# Patient Record
Sex: MALE | Race: WHITE | Employment: OTHER | URBAN - METROPOLITAN AREA
[De-identification: names, ages, dates, MRNs, and addresses within clinical notes are randomized per-mention and may not be internally consistent; named-entity substitution may affect disease eponyms.]

---

## 2023-06-21 ENCOUNTER — VIRTUAL VISIT (OUTPATIENT)
Dept: UROLOGY | Facility: CLINIC | Age: 68
End: 2023-06-21
Payer: COMMERCIAL

## 2023-06-21 DIAGNOSIS — N13.8 BPH WITH OBSTRUCTION/LOWER URINARY TRACT SYMPTOMS: Primary | ICD-10-CM

## 2023-06-21 DIAGNOSIS — N40.1 BPH WITH OBSTRUCTION/LOWER URINARY TRACT SYMPTOMS: Primary | ICD-10-CM

## 2023-06-21 PROCEDURE — 99204 OFFICE O/P NEW MOD 45 MIN: CPT | Mod: VID | Performed by: PHYSICIAN ASSISTANT

## 2023-06-21 RX ORDER — OLMESARTAN MEDOXOMIL 40 MG/1
40 TABLET ORAL DAILY
COMMUNITY

## 2023-06-21 RX ORDER — ATORVASTATIN CALCIUM 40 MG/1
40 TABLET, FILM COATED ORAL DAILY
COMMUNITY

## 2023-06-21 RX ORDER — LEVOTHYROXINE SODIUM 175 UG/1
175 TABLET ORAL DAILY
COMMUNITY

## 2023-06-21 RX ORDER — TAMSULOSIN HYDROCHLORIDE 0.4 MG/1
0.4 CAPSULE ORAL DAILY
COMMUNITY

## 2023-06-21 NOTE — PROGRESS NOTES
Deangelo is a 67 year old who is being evaluated via a billable video visit.      How would you like to obtain your AVS? MyChart  If the video visit is dropped, the invitation should be resent by: Text to cell phone: [phone]  Will anyone else be joining your video visit? No      Tracy Murphy CMA    Video-Visit Details    Type of service:  Video Visit   Start time: 11:04am  End time: 11:22am    Originating Location (pt. Location): Home    Distant Location (provider location):  On-site  Platform used for Video Visit: Ultius     CC: LUTS    HPI:  Deangelo Espinoza is a very pleasant 67 year old male who presents in consultation via self referral for evaluation of the above. He has been told her has an enlarged prostate. Has been on Flomax for years. In April, noted bladder pressure, sense of incomplete emptying, hesitancy, dribbling stream. No hematuria, no dysuria. Bowels regular. Called PCP and given abx course for 7 days and increased Flomax to 0.8mg (contnued to have trouble at end of course). Next ws given Rx for Cipro and finasteride without improvement or actually worsening of symptoms (nocturia x 3-4, dizziness). Stopped finasteride after 10 days.      Back down to 0.4mg Flomax and notes nocturia x 1    Lives in [place] in the winter. Lost  in May.     PSA 1 year ago (in 1 range).     History reviewed. No pertinent past medical history.    Past Surgical History:   Procedure Laterality Date     HERNIA REPAIR         Social History     Socioeconomic History     Marital status:      Spouse name: Not on file     Number of children: Not on file     Years of education: Not on file     Highest education level: Not on file   Occupational History     Not on file   Tobacco Use     Smoking status: Never     Smokeless tobacco: Never   Substance and Sexual Activity     Alcohol use: Yes     Drug use: Never     Sexual activity: Not on file   Other Topics Concern     Not on file   Social History Narrative      Not on file     Social Determinants of Health     Financial Resource Strain: Not on file   Food Insecurity: Not on file   Transportation Needs: Not on file   Physical Activity: Not on file   Stress: Not on file   Social Connections: Not on file   Intimate Partner Violence: Not on file   Housing Stability: Not on file       No family history on file.    No Known Allergies    Current Outpatient Medications   Medication     atorvastatin (LIPITOR) 40 MG tablet     levothyroxine (SYNTHROID/LEVOTHROID) 175 MCG tablet     olmesartan (BENICAR) 40 MG tablet     tamsulosin (FLOMAX) 0.4 MG capsule     No current facility-administered medications for this visit.         PEx:   There were no vitals taken for this visit.    PSYCH: NAD  EYES: EOMI  MOUTH: MMM  NEURO: AAO x3    Urine:      A/P: Deangelo Espinoza is a 67 year old male with BPH w/ LUTS  -We discussed a variety of reasons for worsening LUTS  -PSA annually until 73 years.   -If future episodes, make note if constipation or high irritant consumption, increase flomax to 2 caps and notify me. Consider UA/UC and PVR at that time and if signs of prostatitis, would obtain semen culture.   -follow-up SMITA Tamayo [facility]        28 minutes spent on the date of the encounter doing chart review, review of outside records, review of test results, interpretation of tests, patient visit and documentation

## 2023-06-21 NOTE — LETTER
6/21/2023       RE: Deangelo Espinoza  [address] [address]     Dear Colleague,    Thank you for referring your patient, Deangelo Espinoza, to the [facility] at [facility]. Please see a copy of my visit note below.    Deangelo is a 67 year old who is being evaluated via a billable video visit.      How would you like to obtain your AVS? MyChart  If the video visit is dropped, the invitation should be resent by: Text to cell phone: [phone]  Will anyone else be joining your video visit? No      Tracy Murphy CMA    Video-Visit Details    Type of service:  Video Visit   Start time: 11:04am  End time: 11:22am    Originating Location (pt. Location): Home    Distant Location (provider location):  On-site  Platform used for Video Visit: Narus     CC: LUTS    HPI:  Deangelo Espinoza is a very pleasant 67 year old male who presents in consultation via self referral for evaluation of the above. He has been told her has an enlarged prostate. Has been on Flomax for years. In April, noted bladder pressure, sense of incomplete emptying, hesitancy, dribbling stream. No hematuria, no dysuria. Bowels regular. Called PCP and given abx course for 7 days and increased Flomax to 0.8mg (contnued to have trouble at end of course). Next ws given Rx for Cipro and finasteride without improvement or actually worsening of symptoms (nocturia x 3-4, dizziness). Stopped finasteride after 10 days.      Back down to 0.4mg Flomax and notes nocturia x 1    Lives in [place] in the winter. Lost  in May.     PSA 1 year ago (in 1 range).     History reviewed. No pertinent past medical history.    Past Surgical History:   Procedure Laterality Date    HERNIA REPAIR         Social History     Socioeconomic History    Marital status:      Spouse name: Not on file    Number of children: Not on file    Years of education: Not on file    Highest education  level: Not on file   Occupational History    Not on file   Tobacco Use    Smoking status: Never    Smokeless tobacco: Never   Substance and Sexual Activity    Alcohol use: Yes    Drug use: Never    Sexual activity: Not on file   Other Topics Concern    Not on file   Social History Narrative    Not on file     Social Determinants of Health     Financial Resource Strain: Not on file   Food Insecurity: Not on file   Transportation Needs: Not on file   Physical Activity: Not on file   Stress: Not on file   Social Connections: Not on file   Intimate Partner Violence: Not on file   Housing Stability: Not on file       No family history on file.    No Known Allergies    Current Outpatient Medications   Medication    atorvastatin (LIPITOR) 40 MG tablet    levothyroxine (SYNTHROID/LEVOTHROID) 175 MCG tablet    olmesartan (BENICAR) 40 MG tablet    tamsulosin (FLOMAX) 0.4 MG capsule     No current facility-administered medications for this visit.         PEx:   There were no vitals taken for this visit.    PSYCH: NAD  EYES: EOMI  MOUTH: MMM  NEURO: AAO x3    Urine:      A/P: Deangelo Espinoza is a 67 year old male with BPH w/ LUTS  -We discussed a variety of reasons for worsening LUTS  -PSA annually until 73 years.   -If future episodes, make note if constipation or high irritant consumption, increase flomax to 2 caps and notify me. Consider UA/UC and PVR at that time and if signs of prostatitis, would obtain semen culture.   -follow-up PRITESH Sparks PA-C  [facility]        28 minutes spent on the date of the encounter doing chart review, review of outside records, review of test results, interpretation of tests, patient visit and documentation

## 2023-06-21 NOTE — PATIENT INSTRUCTIONS
Below is a list of things that can irritate the bladder and should be eliminated:    Caffeinated soft drinks.  Coffee.  Tea.  Chocolate.  Tomato-based foods.  Acidic juices and fruits. (includes cranberry juice)  Alcohol.  Nicotine  Carbonated drinks.  Aspartame/Nutrasweet.     If future episodes, make note if constipation or high irritant consumption, increase flomax to 2 caps and notify me. Consider urine culture and bladder scan at that time and if signs of prostatitis, would obtain semen culture.

## 2024-01-14 ENCOUNTER — HEALTH MAINTENANCE LETTER (OUTPATIENT)
Age: 69
End: 2024-01-14

## 2025-01-26 ENCOUNTER — HEALTH MAINTENANCE LETTER (OUTPATIENT)
Age: 70
End: 2025-01-26

## 2025-05-21 ENCOUNTER — LAB (OUTPATIENT)
Dept: LAB | Facility: CLINIC | Age: 70
End: 2025-05-21
Payer: COMMERCIAL

## 2025-05-21 ENCOUNTER — RESULTS FOLLOW-UP (OUTPATIENT)
Dept: URGENT CARE | Facility: CLINIC | Age: 70
End: 2025-05-21

## 2025-05-21 ENCOUNTER — VIRTUAL VISIT (OUTPATIENT)
Dept: URGENT CARE | Facility: CLINIC | Age: 70
End: 2025-05-21
Payer: COMMERCIAL

## 2025-05-21 DIAGNOSIS — N39.9 URINARY PROBLEM IN MALE: Primary | ICD-10-CM

## 2025-05-21 DIAGNOSIS — N41.0 PROSTATITIS, ACUTE: ICD-10-CM

## 2025-05-21 DIAGNOSIS — N39.9 URINARY PROBLEM IN MALE: ICD-10-CM

## 2025-05-21 LAB
ALBUMIN UR-MCNC: NEGATIVE MG/DL
APPEARANCE UR: CLEAR
BILIRUB UR QL STRIP: NEGATIVE
COLOR UR AUTO: YELLOW
GLUCOSE UR STRIP-MCNC: NEGATIVE MG/DL
HGB UR QL STRIP: NEGATIVE
KETONES UR STRIP-MCNC: NEGATIVE MG/DL
LEUKOCYTE ESTERASE UR QL STRIP: NEGATIVE
NITRATE UR QL: NEGATIVE
PH UR STRIP: 5.5 [PH] (ref 5–7)
SP GR UR STRIP: 1.01 (ref 1–1.03)
UROBILINOGEN UR STRIP-ACNC: 0.2 E.U./DL

## 2025-05-21 PROCEDURE — 81003 URINALYSIS AUTO W/O SCOPE: CPT

## 2025-05-21 PROCEDURE — 98001 SYNCH AUDIO-VIDEO NEW LOW 30: CPT

## 2025-05-21 RX ORDER — CIPROFLOXACIN 500 MG/1
500 TABLET, FILM COATED ORAL 2 TIMES DAILY
Qty: 14 TABLET | Refills: 0 | Status: SHIPPED | OUTPATIENT
Start: 2025-05-21 | End: 2025-05-28

## 2025-05-21 NOTE — PROGRESS NOTES
"  Deangelo Espinoza is a 69 year old male who is being evaluated via a billable video visit.      The patient has been notified of following at the time of scheduling video visit:     \"This video visit will be conducted via a video call between you and your physician/provider. We have found that certain health care needs can be provided without the need for a physical exam.  This service lets us provide the care you need with a video conversation.  If a prescription is necessary we can send it directly to your pharmacy.  If lab work is needed we can place an order for that and you can then stop by our lab to have the test done at a later time.\"   Patient has given consent for video visit?  YES    SUBJECTIVE:  Deangelo Espinoza is an 69 year old male who presents for urinary issue.  Over the past few days he has trouble urinating in the evening.  Is getting up to void 4-5 times a night which is mor often than usual for him.  Has a sense of pain and pressure in lower abdomen.  No pain or burning with passing urine.  Urine looks a little different than usual, like more paile, and has a weak stream.  Has had these sxs a couple times before an was dx with prostatitis and treated with cipro.  Has seen a urologist several years ago but was asymptomatic when he was seen and they told him to f/up when had recurrence.  No fevers, chills, sweats.  No n/v/d.  Feels fine otherwise.    PMH:   has no past medical history on file.  There is no problem list on file for this patient.    Social History     Socioeconomic History    Marital status:    Tobacco Use    Smoking status: Never    Smokeless tobacco: Never   Substance and Sexual Activity    Alcohol use: Yes    Drug use: Never     Social Drivers of Health     Food Insecurity: No Food Insecurity (3/1/2025)    Received from [facility]    Hunger Vital Sign     Worried About Running Out of Food in the Last Year: Never true     Ran Out of Food in the Last Year: Never true "   Transportation Needs: No Transportation Needs (3/1/2025)    Received from [facility]    PRAPARE - Transportation     Lack of Transportation (Medical): No     Lack of Transportation (Non-Medical): No   Physical Activity: Sufficiently Active (3/1/2025)    Received from [facility]    Exercise Vital Sign     Days of Exercise per Week: 7 days     Minutes of Exercise per Session: 60 min   Housing Stability: Low Risk  (3/1/2025)    Received from [facility]    Housing Stability     What is your living situation today?: I have a steady place to live     No family history on file.    ALLERGIES:  Patient has no known allergies.    Current Outpatient Medications   Medication Sig Dispense Refill    atorvastatin (LIPITOR) 40 MG tablet Take 40 mg by mouth daily      levothyroxine (SYNTHROID/LEVOTHROID) 175 MCG tablet Take 175 mcg by mouth daily      olmesartan (BENICAR) 40 MG tablet Take 40 mg by mouth daily      tamsulosin (FLOMAX) 0.4 MG capsule Take 0.4 mg by mouth daily       No current facility-administered medications for this visit.         ROS:  ROS is done and is negative for general/constitutional, eye, ENT, Respiratory, cardiovascular, GI, , Skin, musculoskeletal except as noted elsewhere.  All other review of systems negative except as noted elsewhere.      OBJECTIVE:    No vital signs obtained as is virtual visit    GENERAL: alert and no distress  EYES: Eyes grossly normal to inspection.  No discharge or erythema, or obvious scleral/conjunctival abnormalities.  RESP: No audible wheeze, cough, or visible cyanosis.    SKIN: Visible skin clear. No significant rash, abnormal pigmentation or lesions.  NEURO: Cranial nerves grossly intact.  Mentation and speech appropriate for age.  PSYCH: Appropriate affect, tone, and pace of words         ASSESSMENT/PLAN:    ASSESSMENT / PLAN:  (N39.9) Urinary problem in male  (primary encounter diagnosis)  Comment: based on his hx, suspect recurrence of prostatitis.  Will have pt  leave a urine sample before starting on abx.  After leaves sample at lab, can start cipro as this has worked well for him before.  Continue flomax.  Suspect he may have some underlying bph.  Advised him to see a urologist.  Plan: UA Macroscopic with reflex to Microscopic and         Culture - Lab Collect, Urine Culture Aerobic         Bacterial - lab collect, ciprofloxacin (CIPRO)         500 MG tablet, Adult Urology  Referral        Reviewed medication instructions and side effects including tendon problems and tendon rupture. Follow up if experiences side effects.. I reviewed supportive care, otc meds to use if needed, expected course, and signs of concern.  Follow up as needed or if does not improve within 5 day(s) or if worsens in any way.  Reviewed red flag symptoms and is to go to the ER if experiences any of these.    (N41.0) Prostatitis, acute  Comment: as above  Plan: UA Macroscopic with reflex to Microscopic and         Culture - Lab Collect, Urine Culture Aerobic         Bacterial - lab collect, ciprofloxacin (CIPRO)         500 MG tablet, Adult Urology  Referral              See [facility] for orders, medications, letters, patient instructions    Shakila Belle MD  5/21/2025, 2:40 PM    Video-Visit Details    Video Start Time: 2:45    Type of service:  Video Visit    Video End Time:2:59 PM    Originating Location (pt. Location): Home    Distant Location (provider location):  [facility]     [facility]

## 2025-05-23 LAB — BACTERIA UR CULT: NO GROWTH

## 2025-05-26 ENCOUNTER — APPOINTMENT (OUTPATIENT)
Dept: GENERAL RADIOLOGY | Facility: CLINIC | Age: 70
End: 2025-05-26
Attending: EMERGENCY MEDICINE
Payer: COMMERCIAL

## 2025-05-26 ENCOUNTER — HOSPITAL ENCOUNTER (EMERGENCY)
Facility: CLINIC | Age: 70
Discharge: HOME | End: 2025-05-26
Attending: EMERGENCY MEDICINE | Admitting: EMERGENCY MEDICINE
Payer: COMMERCIAL

## 2025-05-26 ENCOUNTER — NURSE TRIAGE (OUTPATIENT)
Dept: NURSING | Facility: CLINIC | Age: 70
End: 2025-05-26
Payer: COMMERCIAL

## 2025-05-26 ENCOUNTER — APPOINTMENT (OUTPATIENT)
Dept: ULTRASOUND IMAGING | Facility: CLINIC | Age: 70
End: 2025-05-26
Attending: EMERGENCY MEDICINE
Payer: COMMERCIAL

## 2025-05-26 VITALS
TEMPERATURE: 98 F | OXYGEN SATURATION: 96 % | HEART RATE: 72 BPM | DIASTOLIC BLOOD PRESSURE: 77 MMHG | SYSTOLIC BLOOD PRESSURE: 116 MMHG | RESPIRATION RATE: 16 BRPM

## 2025-05-26 DIAGNOSIS — R39.15 URINARY URGENCY: ICD-10-CM

## 2025-05-26 DIAGNOSIS — R06.02 SHORTNESS OF BREATH: ICD-10-CM

## 2025-05-26 PROBLEM — E03.9 HYPOTHYROIDISM: Status: ACTIVE | Noted: 2021-03-09

## 2025-05-26 PROBLEM — I10 HTN (HYPERTENSION): Status: ACTIVE | Noted: 2021-03-09

## 2025-05-26 PROBLEM — C43.9 METASTATIC MELANOMA (H): Status: ACTIVE | Noted: 2025-05-26

## 2025-05-26 PROBLEM — C43.59 MALIGNANT MELANOMA OF TORSO EXCLUDING BREAST (H): Status: ACTIVE | Noted: 2024-02-29

## 2025-05-26 PROBLEM — J45.909 ASTHMA: Status: ACTIVE | Noted: 2025-05-26

## 2025-05-26 PROBLEM — N40.0 BPH (BENIGN PROSTATIC HYPERPLASIA): Status: ACTIVE | Noted: 2025-05-26

## 2025-05-26 PROBLEM — E78.00 HIGH CHOLESTEROL: Status: ACTIVE | Noted: 2025-05-26

## 2025-05-26 PROBLEM — E04.1 THYROID NODULE: Status: ACTIVE | Noted: 2025-05-26

## 2025-05-26 LAB
ANION GAP SERPL CALCULATED.3IONS-SCNC: 10 MMOL/L (ref 7–15)
ATRIAL RATE - MUSE: 86 BPM
BASOPHILS # BLD AUTO: 0 10E3/UL (ref 0–0.2)
BASOPHILS NFR BLD AUTO: 0 %
BUN SERPL-MCNC: 9.8 MG/DL (ref 8–23)
CALCIUM SERPL-MCNC: 9.2 MG/DL (ref 8.8–10.4)
CHLORIDE SERPL-SCNC: 94 MMOL/L (ref 98–107)
CREAT SERPL-MCNC: 1.16 MG/DL (ref 0.67–1.17)
D DIMER PPP FEU-MCNC: 0.72 UG/ML FEU (ref 0–0.5)
DIASTOLIC BLOOD PRESSURE - MUSE: NORMAL MMHG
EGFRCR SERPLBLD CKD-EPI 2021: 67 ML/MIN/1.73M2
EOSINOPHIL # BLD AUTO: 0.3 10E3/UL (ref 0–0.7)
EOSINOPHIL NFR BLD AUTO: 6 %
ERYTHROCYTE [DISTWIDTH] IN BLOOD BY AUTOMATED COUNT: 11.6 % (ref 10–15)
GLUCOSE SERPL-MCNC: 101 MG/DL (ref 70–99)
HCO3 SERPL-SCNC: 27 MMOL/L (ref 22–29)
HCT VFR BLD AUTO: 38.8 % (ref 40–53)
HGB BLD-MCNC: 14.2 G/DL (ref 13.3–17.7)
HOLD SPECIMEN: NORMAL
IMM GRANULOCYTES # BLD: 0 10E3/UL
IMM GRANULOCYTES NFR BLD: 0 %
INTERPRETATION ECG - MUSE: NORMAL
LYMPHOCYTES # BLD AUTO: 2.1 10E3/UL (ref 0.8–5.3)
LYMPHOCYTES NFR BLD AUTO: 43 %
MCH RBC QN AUTO: 31.8 PG (ref 26.5–33)
MCHC RBC AUTO-ENTMCNC: 36.6 G/DL (ref 31.5–36.5)
MCV RBC AUTO: 87 FL (ref 78–100)
MONOCYTES # BLD AUTO: 0.6 10E3/UL (ref 0–1.3)
MONOCYTES NFR BLD AUTO: 12 %
NEUTROPHILS # BLD AUTO: 1.9 10E3/UL (ref 1.6–8.3)
NEUTROPHILS NFR BLD AUTO: 39 %
NRBC # BLD AUTO: 0 10E3/UL
NRBC BLD AUTO-RTO: 0 /100
P AXIS - MUSE: 29 DEGREES
PLATELET # BLD AUTO: 146 10E3/UL (ref 150–450)
POTASSIUM SERPL-SCNC: 3.3 MMOL/L (ref 3.4–5.3)
PR INTERVAL - MUSE: 174 MS
QRS DURATION - MUSE: 98 MS
QT - MUSE: 382 MS
QTC - MUSE: 457 MS
R AXIS - MUSE: 31 DEGREES
RBC # BLD AUTO: 4.47 10E6/UL (ref 4.4–5.9)
SODIUM SERPL-SCNC: 131 MMOL/L (ref 135–145)
SYSTOLIC BLOOD PRESSURE - MUSE: NORMAL MMHG
T AXIS - MUSE: 28 DEGREES
TROPONIN T SERPL HS-MCNC: 14 NG/L
VENTRICULAR RATE- MUSE: 86 BPM
WBC # BLD AUTO: 5 10E3/UL (ref 4–11)

## 2025-05-26 PROCEDURE — 85379 FIBRIN DEGRADATION QUANT: CPT | Performed by: EMERGENCY MEDICINE

## 2025-05-26 PROCEDURE — 80048 BASIC METABOLIC PNL TOTAL CA: CPT | Performed by: EMERGENCY MEDICINE

## 2025-05-26 PROCEDURE — 85025 COMPLETE CBC W/AUTO DIFF WBC: CPT | Performed by: EMERGENCY MEDICINE

## 2025-05-26 PROCEDURE — 71046 X-RAY EXAM CHEST 2 VIEWS: CPT

## 2025-05-26 PROCEDURE — 99285 EMERGENCY DEPT VISIT HI MDM: CPT | Mod: 25

## 2025-05-26 PROCEDURE — 36415 COLL VENOUS BLD VENIPUNCTURE: CPT | Performed by: EMERGENCY MEDICINE

## 2025-05-26 PROCEDURE — 250N000013 HC RX MED GY IP 250 OP 250 PS 637: Performed by: EMERGENCY MEDICINE

## 2025-05-26 PROCEDURE — 84484 ASSAY OF TROPONIN QUANT: CPT | Performed by: EMERGENCY MEDICINE

## 2025-05-26 PROCEDURE — 96360 HYDRATION IV INFUSION INIT: CPT

## 2025-05-26 PROCEDURE — 258N000003 HC RX IP 258 OP 636: Performed by: EMERGENCY MEDICINE

## 2025-05-26 PROCEDURE — 93970 EXTREMITY STUDY: CPT

## 2025-05-26 PROCEDURE — 96361 HYDRATE IV INFUSION ADD-ON: CPT

## 2025-05-26 PROCEDURE — 93005 ELECTROCARDIOGRAM TRACING: CPT

## 2025-05-26 RX ORDER — POTASSIUM CHLORIDE 1.5 G/1.58G
20 POWDER, FOR SOLUTION ORAL ONCE
Status: COMPLETED | OUTPATIENT
Start: 2025-05-26 | End: 2025-05-26

## 2025-05-26 RX ADMIN — SODIUM CHLORIDE 1000 ML: 0.9 INJECTION, SOLUTION INTRAVENOUS at 14:44

## 2025-05-26 RX ADMIN — POTASSIUM CHLORIDE 20 MEQ: 1.5 POWDER, FOR SOLUTION ORAL at 14:45

## 2025-05-26 ASSESSMENT — COLUMBIA-SUICIDE SEVERITY RATING SCALE - C-SSRS
6. HAVE YOU EVER DONE ANYTHING, STARTED TO DO ANYTHING, OR PREPARED TO DO ANYTHING TO END YOUR LIFE?: NO
2. HAVE YOU ACTUALLY HAD ANY THOUGHTS OF KILLING YOURSELF IN THE PAST MONTH?: NO
1. IN THE PAST MONTH, HAVE YOU WISHED YOU WERE DEAD OR WISHED YOU COULD GO TO SLEEP AND NOT WAKE UP?: NO

## 2025-05-26 ASSESSMENT — ACTIVITIES OF DAILY LIVING (ADL)
ADLS_ACUITY_SCORE: 41

## 2025-05-26 NOTE — TELEPHONE ENCOUNTER
Nurse Triage SBAR    Is this a 2nd Level Triage? NO    Situation: lightheadedness and urinary retention. Some shortness of breath when standing when he becomes lightheaded.    Background: Wondering if the two are related. Also has PET scan tomorrow at [place] that he doesn't want to miss. Shortness of breath lightheaded and dizzy off and on last several weeks. Has gotten a lot worse recently. Problems emptying his bladder. Also having low appetite.  Belching a lot. A few weeks ago was having leg pain upon awakening. Needs to strain to have bowel movements.    Assessment: Needs to evaluated.    Protocol Recommended Disposition:   Go to ED  Be seen within 24 hours.  Caller stated understanding and agreement of both dispositions.     Reason for Disposition   [1] Unable to urinate (or only a few drops) > 4 hours AND [2] bladder feels very full (e.g., palpable bladder or strong urge to urinate)   [1] MODERATE dizziness (e.g., interferes with normal activities) AND [2] has NOT been evaluated by doctor (or NP/PA) for this  (Exception: Dizziness caused by heat exposure, sudden standing, or poor fluid intake.)    Additional Information   Negative: Shock suspected (e.g., cold/pale/clammy skin, too weak to stand, low BP, rapid pulse)   Negative: Sounds like a life-threatening emergency to the triager   Negative: Followed a female genital area injury (e.g., labia, vagina, vulva)   Negative: Followed a male genital area injury (e.g., penis, scrotum)   Negative: Vaginal discharge   Negative: Pus (white, yellow) or bloody discharge from end of penis   Negative: [1] Taking antibiotic for urinary tract infection (UTI) AND [2] female   Negative: [1] Taking antibiotic for urinary tract infection (UTI) AND [2] male   Negative: [1] Pain or burning with passing urine (urination) AND [2] pregnant   Negative: [1] Pain or burning with passing urine (urination) AND [2] postpartum (from 0 to 6 weeks after delivery)   Negative: [1] Pain or  "burning with passing urine (urination) AND [2] female   Negative: [1] Pain or burning with passing urine (urination) AND [2] male   Negative: Pain or itching in the vulvar area   Negative: Pain in scrotum is main symptom   Negative: Blood in the urine is main symptom   Negative: Symptoms arising from use of a urinary catheter (e.g., Coude, Christensen)   Negative: SEVERE difficulty breathing (e.g., struggling for each breath, speaks in single words)   Negative: [1] Difficulty breathing or swallowing AND [2] started suddenly after medicine, an allergic food or bee sting   Negative: Shock suspected (e.g., cold/pale/clammy skin, too weak to stand, low BP, rapid pulse)   Negative: Difficult to awaken or acting confused (e.g., disoriented, slurred speech)   Negative: [1] Weakness (i.e., paralysis, loss of muscle strength) of the face, arm or leg on one side of the body AND [2] sudden onset AND [3] present now   Negative: [1] Numbness (i.e., loss of sensation) of the face, arm or leg on one side of the body AND [2] sudden onset AND [3] present now   Negative: [1] Loss of speech or garbled speech AND [2] sudden onset AND [3] present now   Negative: Overdose (accidental or intentional) of medications   Negative: [1] Fainted > 15 minutes ago AND [2] still feels too weak or dizzy to stand   Negative: Heart beating < 50 beats per minute OR > 140 beats per minute   Negative: Sounds like a life-threatening emergency to the triager   Negative: Chest pain   Negative: Rectal bleeding, bloody stool, or tarry-black stool   Negative: [1] Vomiting AND [2] contains red blood or black (\"coffee ground\") material   Negative: Vomiting is main symptom   Negative: Diarrhea is main symptom   Negative: Headache is main symptom   Negative: Patient states that they are having an anxiety or panic attack   Negative: Dizziness from low blood sugar (i.e., < 60 mg/dl or 3.5 mmol/l)   Negative: Dizziness is described as a spinning sensation (i.e., vertigo)   " "Negative: Heat exhaustion suspected (i.e., dehydration from heat exposure)   Negative: Difficulty breathing   Negative: SEVERE dizziness (e.g., unable to stand, requires support to walk, feels like passing out now)   Negative: Extra heartbeats, irregular heart beating, or heart is beating very fast  (i.e., \"palpitations\")   Negative: [1] Drinking very little AND [2] dehydration suspected (e.g., no urine > 12 hours, very dry mouth, very lightheaded)   Negative: [1] Numbness (i.e., loss of sensation) of the face, arm / hand, or leg / foot on one side of the body AND [2] sudden onset AND [3] brief (now gone)   Negative: [1] Weakness (i.e., paralysis, loss of muscle strength) of the face, arm / hand, or leg / foot on one side of the body AND [2] sudden onset AND [3] brief (now gone)   Negative: [1] Loss of speech or garbled speech AND [2] sudden onset AND [3] brief (now gone)   Negative: Loss of vision or double vision  (Exception: Similar to previous migraines.)   Negative: Patient sounds very sick or weak to the triager   Negative: [1] Dizziness caused by heat exposure, sudden standing, or poor fluid intake AND [2] no improvement after 2 hours of rest and fluids   Negative: [1] Fever > 103 F (39.4 C) AND [2] not able to get the fever down using Fever Care Advice   Negative: [1] Fever > 101 F (38.3 C) AND [2] age > 60 years   Negative: [1] Fever > 100.0 F (37.8 C) AND [2] bedridden (e.g., CVA, chronic illness, recovering from surgery)   Negative: [1] Fever > 100.0 F (37.8 C) AND [2] diabetes mellitus or weak immune system (e.g., HIV positive, cancer chemo, splenectomy, organ transplant, chronic steroids)    Answer Assessment - Initial Assessment Questions  1. SYMPTOM: \"What's the main symptom you're concerned about?\" (e.g., frequency, incontinence)      Retained urine.  Feels pressure - heavy pain/discomfort.  2. ONSET: \"When did the retention/pain start?\"      A week ago.  3. PAIN: \"Is there any pain?\" If Yes, ask: " "\"How bad is it?\" (Scale: 1-10; mild, moderate, severe)      7/10 at night  4. CAUSE: \"What do you think is causing the symptoms?\"      Unsure  5. OTHER SYMPTOMS: \"Do you have any other symptoms?\" (e.g., blood in urine, fever, flank pain, pain with urination)      None  6. PREGNANCY: \"Is there any chance you are pregnant?\" \"When was your last menstrual period?\"      N/a    Answer Assessment - Initial Assessment Questions  1. DESCRIPTION: \"Describe your dizziness.\"    None  2. LIGHTHEADED: \"Do you feel lightheaded?\" (e.g., somewhat faint, woozy, weak upon standing)      Yes  3. VERTIGO: \"Do you feel like either you or the room is spinning or tilting?\" (i.e. vertigo)      No  4. SEVERITY: \"How bad is it?\"  \"Do you feel like you are going to faint?\" \"Can you stand and walk?\"    - MILD: Feels slightly dizzy, but walking normally.    - MODERATE: Feels unsteady when walking, but not falling; interferes with normal activities (e.g., school, work).    - SEVERE: Unable to walk without falling, or requires assistance to walk without falling; feels like passing out now.       Walks slowly, cautiously.  5. ONSET:  \"When did the dizziness begin?\"      Gotten worse in the past week.  6. AGGRAVATING FACTORS: \"Does anything make it worse?\" (e.g., standing, change in head position)      Standing  7. HEART RATE: \"Can you tell me your heart rate?\" \"How many beats in 15 seconds?\"  (Note: not all patients can do this)        Elevated.  90's.  Usually 75-80  8. CAUSE: \"What do you think is causing the dizziness?\"      Unsure  9. RECURRENT SYMPTOM: \"Have you had dizziness before?\" If Yes, ask: \"When was the last time?\" \"What happened that time?\"      This has gone one for a while.  Worse the last week.  10. OTHER SYMPTOMS: \"Do you have any other symptoms?\" (e.g., fever, chest pain, vomiting, diarrhea, bleeding)        Gets short of breath when standing  11. PREGNANCY: \"Is there any chance you are pregnant?\" \"When was your last menstrual " "period?\"        N/a    Protocols used: Urinary Symptoms-A-AH, Dizziness - Istnxovnmtmaorh-X-UC  Amina AGRAWAL RN [facility]     "

## 2025-05-26 NOTE — ED PROVIDER NOTES
Emergency Department Note      History of Present Illness     Chief Complaint   Shortness of Breath (/) and Urinary Retention      HPI   Deangelo Espinoza is a 69 year old male with history of hypertension and hyperlipidemia who presents to the ED for evaluation of shortness of breath and urinary retention. The patient reports that for the past couple of weeks he has been dealing with pelvic pain and symptoms of frequent urination and feeling like he is incompletely emptying his bladder.  He states that he was prescribed antibiotics which have slightly helped. He reports that he has been having intermittent lightheadedness since the onset of his pelvic pain. The past 2 days, he has been having increasing episodes of lightheadedness as well as shortness of breath. His shortness of breath is worse at night and also with exertion. Patient reports that he had an appointment at [facility] a few days ago and when they walked him up a flight of stairs, his SpO2 dropped to 85% but quickly patrizia to the 90s after short period of time. While at [place], he also had labs done and his creatinine was elevated. Patient notes that lately he has noticed his blood pressure drop lower than normal after taking his anti-hypertensive medications. He denies any chest pain, pleuritic chest pain, or blood thinner use.    The patient reports that he has dealt with urinary urgency frequency in the past and it has always been related to his prostate. However, his urinary symptoms have never lasted as long as this time. He notes that his urine has a yellow color in the day time but is clear at night. Patient has noticed slight leg swelling recently. He states that a few weeks ago he was dealing with leg pain that did not feel muscular. Patient reports that his leg pain has since relieved.    Independent Historian   None    Review of External Notes   I reviewed outpatient virtual visit note from 5/21/2025:  Patient was seen for lower urinary tract  symptoms at that time and was suspected to have a urinary tract infection or prostatitis. He was started on ciprofloxacin 500 mg twice daily.  Urinalysis was ordered and was negative. Urine culture negative.     Past Medical History     Medical History and Problem List   Coronary artery calcinosis  Hypertension  Hypothyroidism  BPH  Ascending aortic aneurysm  Hyperlipidemia  Asthma  Skin cancer    Medications   Atorvastatin  Cipro  Levothyroxine  Olmesartan  Tamsulosin  Aspirin 81 mg  Cialis  Ezetimibe    Surgical History   Hernia repair  Melanoma of skin biopsy, with lymphnode removed    Physical Exam     Patient Vitals for the past 48 hrs:   BP Temp Pulse Resp SpO2   05/26/25 2033 116/77 -- 72 16 96 %   05/26/25 1246 127/81 98  F (36.7  C) 94 16 99 %         Physical Exam  General: Well appearing, nontoxic.  Resting comfortably  Head:  Scalp, face, and head appear normal  Eyes:  Pupils are equal, round    Conjunctivae non-injected and sclerae white  ENT:    The external nose is normal    Pinnae are normal  Neck:  Normal range of motion    There is no rigidity noted    Trachea is in the midline  CV:  Regular rate and rhythm     Normal S1/S2, no S3/S4    No murmur or rub. Radial pulses 2+ bilaterally.  Resp:  Lungs are clear and equal bilaterally  There is no tachypnea    No increased work of breathing    No rales, wheezing, or rhonchi  GI:  Abdomen is soft, no rigidity or guarding    No distension, or mass. No CVA tenderness. Mild suprapubic tenderness. No palpable bladder swelling/distention.    No rebound tenderness   MS:  Normal muscular tone    Symmetric motor strength    No lower extremity edema. No calf swelling or tenderness.  Skin:  No rash or acute skin lesions noted  Neuro:  Awake and alert  Speech is normal and fluent  Moves all extremities spontaneously  Psych: Normal affect. Appropriate interactions.      Diagnostics     Lab Results   Labs Ordered and Resulted from Time of ED Arrival to Time of ED  Departure   BASIC METABOLIC PANEL - Abnormal       Result Value    Sodium 131 (*)     Potassium 3.3 (*)     Chloride 94 (*)     Carbon Dioxide (CO2) 27      Anion Gap 10      Urea Nitrogen 9.8      Creatinine 1.16      GFR Estimate 67      Calcium 9.2      Glucose 101 (*)    CBC WITH PLATELETS AND DIFFERENTIAL - Abnormal    WBC Count 5.0      RBC Count 4.47      Hemoglobin 14.2      Hematocrit 38.8 (*)     MCV 87      MCH 31.8      MCHC 36.6 (*)     RDW 11.6      Platelet Count 146 (*)     % Neutrophils 39      % Lymphocytes 43      % Monocytes 12      % Eosinophils 6      % Basophils 0      % Immature Granulocytes 0      NRBCs per 100 WBC 0      Absolute Neutrophils 1.9      Absolute Lymphocytes 2.1      Absolute Monocytes 0.6      Absolute Eosinophils 0.3      Absolute Basophils 0.0      Absolute Immature Granulocytes 0.0      Absolute NRBCs 0.0     D DIMER QUANTITATIVE - Abnormal    D-Dimer Quantitative 0.72 (*)    TROPONIN T, HIGH SENSITIVITY - Normal    Troponin T, High Sensitivity 14         Imaging   XR Chest 2 Views   Final Result   IMPRESSION: Negative chest.      US Lower Extremity Venous Duplex Bilateral   Final Result   IMPRESSION:   1.  No deep venous thrombosis in the bilateral lower extremities.      NM Lung vent and perf*    (Results Pending)       EKG   ECG taken at 12:53, ECG read at 14:14  Normal sinus rhythm   Rate 86 bpm. ID interval 174 ms. QRS duration 98 ms. QT/QTc 382/457 ms. P-R-T axes 29 31 28.    Independent Interpretation   See ED Course    ED Course      Medications Administered   Medications   sodium chloride 0.9% BOLUS 1,000 mL (0 mLs Intravenous Stopped 5/26/25 2018)   potassium chloride (KLOR-CON) Packet 20 mEq (20 mEq Oral $Given 5/26/25 3110)       Procedures   None     Discussion of Management   None    ED Course   ED Course as of 05/27/25 1640   Mon May 26, 2025   5396 I obtained history and examined the patient as noted above.    2019 On my independent interpretation of the  patient's chest radiograph(s) there is no pneumothorax or large pleural effusions.    2022 I rechecked and updated the patient. Plan is for discharge       Additional Documentation  None    Medical Decision Making / Diagnosis     CMS Diagnoses: None    MIPS   None    MDM   Deangelo Espinoza is a 69 year old male who presents to the emergency department today with several different concerns.  On my evaluation he is well-appearing, hemodynamically stable and afebrile.  No increased work of breathing, respiratory distress or hypoxia.  Lungs are clear to auscultation.  No peripheral edema or evidence of volume overload.  Patient initially reports concerns about urinary urgency frequency and feeling like he is incompletely emptying his bladder.  He recently had a virtual visit and was prescribed ciprofloxacin for this.  He has taken this for several days now and it has not helped.  No hematuria fever.  No abdominal or flank pain.  Urinalysis and urine culture from his virtual visit were negative.  Postvoid residual bladder scan was done in the emergency department and was normal (45ml).  No evidence of urinary retention.  Creatinine and BUN are normal.  Renal function is normal.  Lower urinary tract symptoms are likely due to BPH.  He is already taking Flomax.  He will continue this.  I will have him finish the course of his antibiotics as he has had prostatitis in the past and he will need close follow-up with urology if the symptoms do not resolve.  I encouraged good oral fluid intake.  Clinical presentation is not consistent with kidney stone.  Recent urinalysis had no RBCs.    Additionally patient reported intermittent shortness of breath which is not present on ED evaluation.  CBC without anemia.  No leukocytosis.  High-sensitivity troponin is normal and patient has no chest pain whatsoever.  EKG shows sinus rhythm without evidence of acute ischemia or dysrhythmia.  ED evaluation is not consistent with acute coronary  syndrome, acute aortic emergency.  Pulmonary embolism is considered especially in light of his history of metastatic cancer.  D-dimer was obtained and was 0.72.  Using age-adjusted D-dimer criteria the normal threshold would be 0.71.  The patient's value today is 0.01 above this.  Venous duplex ultrasound of the bilateral lower extremities were negative for DVT.  Patient has a significant IV CT contrast allergy and reports that even with pretreatment in the past he has developed significant hives and discomfort and is refusing IV contrast for CT of the chest with PE protocol.  A nuclear medicine V/Q study is not available today.  Overall the patient is low risk for PE with D-dimer essentially at the age-adjusted expected limit.  He has no secondary signs of PE such as right heart strain, right bundle branch block, elevated troponin, tachycardia, hypotension or hypoxia.  If he has a PE would certainly be a small one and given that his venous duplex ultrasound of the bilateral lower extremities are negative there is no risk for additional clot transit.  This was discussed at length with the patient.  I placed an order for an outpatient VQ scan and patient will discuss with his oncologist whether or not he needs to proceed with this study.  Chest x-ray is negative.  No pneumothorax, pleural effusions, pulmonary edema, pneumonia or any other acute pathology.  No wheezing or bronchospasm on exam.  At this time I find no evidence to suggest an emergent life-threatening or serious underlying etiology for his intermittent shortness of breath.  He should follow-up closely with his oncologist and primary care team.  Close return precautions are provided.  Patient is agreeable with the plan of care and he was discharged in stable condition.    Disposition   The patient was discharged.     Diagnosis     ICD-10-CM    1. Shortness of breath  R06.02 NM Lung vent and perf*      2. Urinary urgency  R39.15            Discharge  Medications   Discharge Medication List as of 5/26/2025  8:31 PM            Scribe Disclosure:  I, Rajinder Harvey, am serving as a scribe at 1:40 PM on 5/26/2025 to document services personally performed by Reinaldo De La Rosa MD based on my observations and the provider's statements to me.        Reinaldo De La Rosa MD  05/27/25 3426

## 2025-05-26 NOTE — ED TRIAGE NOTES
"Pt arrives via triage presenting with multiple complaints. Pt states he has had about 1 month of SOB and lightheadedness, increasing over the last few days. Pt also endorses intermittent urinary retention and says \"I am not emptying my bladder fully.\" Recently went to  and prescribed antibiotics for urinary tract infection. Hx of high creatine so drinking extra water.      Triage Assessment (Adult)       Row Name 05/26/25 1244          Triage Assessment    Airway WDL WDL        Respiratory WDL    Respiratory WDL WDL        Cardiac WDL    Cardiac WDL WDL        Peripheral/Neurovascular WDL    Peripheral Neurovascular WDL WDL        Cognitive/Neuro/Behavioral WDL    Cognitive/Neuro/Behavioral WDL WDL                     "

## 2025-05-27 NOTE — DISCHARGE INSTRUCTIONS
A VQ Lung scan was ordered to further rule out a Pulmonary Embolism (blood clot in the lungs). This can be scheduled as an outpatient. You may complete this as soon as possible or wait to discuss it with your Oncologist. Return to the ER for any  worsening chest pain, shortness of breath, or fainting.

## 2025-06-02 ENCOUNTER — OFFICE VISIT (OUTPATIENT)
Dept: UROLOGY | Facility: CLINIC | Age: 70
End: 2025-06-02
Attending: INTERNAL MEDICINE
Payer: COMMERCIAL

## 2025-06-02 ENCOUNTER — PRE VISIT (OUTPATIENT)
Dept: UROLOGY | Facility: CLINIC | Age: 70
End: 2025-06-02

## 2025-06-02 VITALS
HEIGHT: 70 IN | SYSTOLIC BLOOD PRESSURE: 104 MMHG | DIASTOLIC BLOOD PRESSURE: 70 MMHG | HEART RATE: 91 BPM | BODY MASS INDEX: 28.63 KG/M2 | WEIGHT: 200 LBS | OXYGEN SATURATION: 97 %

## 2025-06-02 DIAGNOSIS — R35.0 URINARY FREQUENCY: ICD-10-CM

## 2025-06-02 DIAGNOSIS — R39.14 FEELING OF INCOMPLETE BLADDER EMPTYING: Primary | ICD-10-CM

## 2025-06-02 DIAGNOSIS — N41.0 PROSTATITIS, ACUTE: ICD-10-CM

## 2025-06-02 DIAGNOSIS — N39.9 URINARY PROBLEM IN MALE: ICD-10-CM

## 2025-06-02 PROCEDURE — 3078F DIAST BP <80 MM HG: CPT | Performed by: STUDENT IN AN ORGANIZED HEALTH CARE EDUCATION/TRAINING PROGRAM

## 2025-06-02 PROCEDURE — 99205 OFFICE O/P NEW HI 60 MIN: CPT | Performed by: STUDENT IN AN ORGANIZED HEALTH CARE EDUCATION/TRAINING PROGRAM

## 2025-06-02 PROCEDURE — 3074F SYST BP LT 130 MM HG: CPT | Performed by: STUDENT IN AN ORGANIZED HEALTH CARE EDUCATION/TRAINING PROGRAM

## 2025-06-02 PROCEDURE — 99417 PROLNG OP E/M EACH 15 MIN: CPT | Performed by: STUDENT IN AN ORGANIZED HEALTH CARE EDUCATION/TRAINING PROGRAM

## 2025-06-02 RX ORDER — HYDROCORTISONE 10 MG/1
TABLET ORAL
COMMUNITY
Start: 2025-05-29 | End: 2025-08-27

## 2025-06-02 NOTE — TELEPHONE ENCOUNTER
MEDICAL RECORDS REQUEST   [facility]  Urology Clinic  [address] [place] [address]  PHONE: [phone]  Fax: [phone]        FUTURE VISIT INFORMATION                                                   Deangelo Espinoza, : 1955 scheduled for future visit at [facility]    APPOINTMENT INFORMATION:  Date: 2025  Provider:  Sarthak Young PA-C  Reason for Visit/Diagnosis: Urinary problem in male    REFERRAL INFORMATION:  Referring provider:  Shakila Belle MD in [facility]    RECORDS REQUESTED FOR VISIT                                                     NOTES  STATUS/DETAILS   OFFICE NOTE from referring provider  YES, 2025 -- Shakila Belle MD in [facility]   DISCHARGE REPORT from the ER  Yes, 2025 -- [facility] ED   MEDICATION LIST  yes   LABS     URINALYSIS (UA)  yes   IMAGES  yes, 2025 -- XR CHEST       PRE-VISIT CHECKLIST      Joint diagnostic appointment coordinated correctly          (ensure right order & amount of time) Yes   RECORD COLLECTION COMPLETE Yes

## 2025-06-02 NOTE — PROGRESS NOTES
Subjective     REFERRING PROVIDER  Shakila Belle MD    REASON FOR VISIT  Urinary frequency, feeling of incomplete bladder emptiness, and nocturia     HISTORY OF PRESENT ILLNESS  Mr. Espinoza is a pleasant 69 year old male with a past medical history significant for metastatic melanoma, hypertension, high cholesterol, and erectile dysfunction who presents today for worsening urinary frequency, nocturia, and feeling of incomplete bladder emptiness.  I personally reviewed the oncology note from 5/29/2025 in preparation for today's visit.    According to the documentation, Deangelo was recently experiencing worsening frequency, feelings of incomplete bladder emptiness, and nocturia waking him 4-5 times per night to urinate.  Also endorsing some increased urination discomfort in his lower abdomen and upper pelvic area that is worse overnight.  Ultimately was referred to urology for further discussion and evaluation.    Of note, I do see a historical prescription for tamsulosin.     Today:  Currently taking tamsulosin 0.8 mg per night  No change after changing his dose to 0.8 mg recently  History of taking antibiotics which did not seem to help  Oral steroids have seemed to help in the past and presently  On hydrocortisone now  No pelvic pain   No gross hematuria   No history of urinary retention   Fluids:  Coffee: one espresso shot with milk  Water: 50 oz  Cranberry juice: 8 oz     Social History:  Denies any history of or current smoking     Family History:  Half brother with prostate cancer     Objective     PHYSICAL EXAM  General: Alert, oriented, no acute distress    LABORATORY   Latest Reference Range & Units 05/21/25 16:09   Color Urine Colorless, Straw, Light Yellow, Yellow  Yellow   Appearance Urine Clear  Clear   Glucose Urine Negative mg/dL Negative   Bilirubin Urine Negative  Negative   Ketones Urine Negative mg/dL Negative   Specific Gravity Urine 1.003 - 1.035  1.015   pH Urine 5.0 - 7.0  5.5   Protein  "Albumin Urine Negative mg/dL Negative   Urobilinogen Urine 0.2, 1.0 E.U./dL 0.2   Nitrite Urine Negative  Negative   Blood Urine Negative  Negative   Leukocyte Esterase Urine Negative  Negative     Outside PSA testin2024: 0.78    TESTING  PVR: 4 mL    Assessment & Plan   Urinary frequency  Feeling of incomplete bladder emptiness  Nocturia     It was my pleasure to meet with Mr. Espinoza in the office today in regards to his urinary changes in the setting of worsening nocturia.  After Deangelo relayed a very robust history, I discussed with him that I do not have an incredibly straightforward answer as to why he is getting abnormal changes to the look of his urine overnight, why he is getting this feeling of a \"lump\" in his suprapubic area when he has the slight feeling of needing to go to the bathroom without a super strong urge, and ultimately why his nocturia has been getting so bad.  We spent some time reviewing that nocturia is a very difficult diagnosis from a urologic standpoint to pin down because very rarely are nocturia issues caused by urologic problems.  We were able to rule out one of the main potential causes of nocturia from a urologic standpoint with his postvoid residual value.  When he is emptying his bladder so well, my suspicion for urologic cause behind the nocturia goes down significantly.    We then spent quite a bit of time reviewing the collection of symptoms that fall under the category of overactive bladder, and how this could lead to many of the symptoms he has been experiencing, though again it is less likely that this is the main concern when his symptoms are primarily at night.  We reviewed different interventions for this, but really only had time to discuss lifestyle changes/modifications as well as pelvic floor physical therapy and anticholinergics/beta 3 agonists.    Finally, we reviewed different ways that we could potentially try to look further into the function of his " "bladder including a cystoscopy, a urodynamic study, and a voiding diary.  At this time, I would be in more favor of a urodynamic study in regards to the invasive testing as I believe this will give us more relevant information to his case, though we should strongly consider a cystoscopy in the future especially based on the results of the UDS.  I believe that he could complete a voiding diary for 2-3 separate 24-hour time frames regardless of what intervention we pursue as this will give us a good baseline of information to be sure he is not overproducing urine at night.    We did spend some time specifically reviewing his current use of steroids and how it is improving his symptoms.  I do not have a great explanation as to why steroids in the past and potentially currently have \"fixed\" the issue for months on end.  Steroids have a strong anti-inflammatory properties and generally have a way of making patient's feel well.  We will see how he feels when he eventually comes off of the steroids.    Deangelo asked many insightful questions, but ultimately decided to proceed first with his voiding diaries and then we will consider further testing in the future. Mr. Espinoza expressed understanding and agreement to the above discussion and plan and all of his questions were answered to his satisfaction.     PLAN  Voiding diaries to be completed with results to be sent over [employer]  Future consideration of UDS and or cystoscopy    Signed by:    Sarthak Young PA-C    I spent a total of 87 minutes spent on the date of the encounter doing chart review, history and exam, documentation, and further activities as noted above.   "

## 2025-06-02 NOTE — LETTER
6/2/2025       RE: Deangelo Espinoza  [address] [address]     Dear Colleague,    Thank you for referring your patient, Deangelo Espinoza, to the [facility]. Please see a copy of my visit note below.    Subjective    REFERRING PROVIDER  Shakila Belle MD    REASON FOR VISIT  Urinary frequency, feeling of incomplete bladder emptiness, and nocturia     HISTORY OF PRESENT ILLNESS  Mr. Espinoza is a pleasant 69 year old male with a past medical history significant for metastatic melanoma, hypertension, high cholesterol, and erectile dysfunction who presents today for worsening urinary frequency, nocturia, and feeling of incomplete bladder emptiness.  I personally reviewed the oncology note from 5/29/2025 in preparation for today's visit.    According to the documentation, Deangelo was recently experiencing worsening frequency, feelings of incomplete bladder emptiness, and nocturia waking him 4-5 times per night to urinate.  Also endorsing some increased urination discomfort in his lower abdomen and upper pelvic area that is worse overnight.  Ultimately was referred to urology for further discussion and evaluation.    Of note, I do see a historical prescription for tamsulosin.     Today:  Currently taking tamsulosin 0.8 mg per night  No change after changing his dose to 0.8 mg recently  History of taking antibiotics which did not seem to help  Oral steroids have seemed to help in the past and presently  On hydrocortisone now  No pelvic pain   No gross hematuria   No history of urinary retention   Fluids:  Coffee: one espresso shot with milk  Water: 50 oz  Cranberry juice: 8 oz     Social History:  Denies any history of or current smoking     Family History:  Half brother with prostate cancer     Objective    PHYSICAL EXAM  General: Alert, oriented, no acute distress    LABORATORY   Latest Reference Range & Units  "25 16:09   Color Urine Colorless, Straw, Light Yellow, Yellow  Yellow   Appearance Urine Clear  Clear   Glucose Urine Negative mg/dL Negative   Bilirubin Urine Negative  Negative   Ketones Urine Negative mg/dL Negative   Specific Gravity Urine 1.003 - 1.035  1.015   pH Urine 5.0 - 7.0  5.5   Protein Albumin Urine Negative mg/dL Negative   Urobilinogen Urine 0.2, 1.0 E.U./dL 0.2   Nitrite Urine Negative  Negative   Blood Urine Negative  Negative   Leukocyte Esterase Urine Negative  Negative     Outside PSA testin2024: 0.78    TESTING  PVR: 4 mL    Assessment & Plan  Urinary frequency  Feeling of incomplete bladder emptiness  Nocturia     It was my pleasure to meet with Mr. Espinoza in the office today in regards to his urinary changes in the setting of worsening nocturia.  After Deangelo relayed a very robust history, I discussed with him that I do not have an incredibly straightforward answer as to why he is getting abnormal changes to the look of his urine overnight, why he is getting this feeling of a \"lump\" in his suprapubic area when he has the slight feeling of needing to go to the bathroom without a super strong urge, and ultimately why his nocturia has been getting so bad.  We spent some time reviewing that nocturia is a very difficult diagnosis from a urologic standpoint to pin down because very rarely are nocturia issues caused by urologic problems.  We were able to rule out one of the main potential causes of nocturia from a urologic standpoint with his postvoid residual value.  When he is emptying his bladder so well, my suspicion for urologic cause behind the nocturia goes down significantly.    We then spent quite a bit of time reviewing the collection of symptoms that fall under the category of overactive bladder, and how this could lead to many of the symptoms he has been experiencing, though again it is less likely that this is the main concern when his symptoms are primarily at night.  " "We reviewed different interventions for this, but really only had time to discuss lifestyle changes/modifications as well as pelvic floor physical therapy and anticholinergics/beta 3 agonists.    Finally, we reviewed different ways that we could potentially try to look further into the function of his bladder including a cystoscopy, a urodynamic study, and a voiding diary.  At this time, I would be in more favor of a urodynamic study in regards to the invasive testing as I believe this will give us more relevant information to his case, though we should strongly consider a cystoscopy in the future especially based on the results of the UDS.  I believe that he could complete a voiding diary for 2-3 separate 24-hour time frames regardless of what intervention we pursue as this will give us a good baseline of information to be sure he is not overproducing urine at night.    We did spend some time specifically reviewing his current use of steroids and how it is improving his symptoms.  I do not have a great explanation as to why steroids in the past and potentially currently have \"fixed\" the issue for months on end.  Steroids have a strong anti-inflammatory properties and generally have a way of making patient's feel well.  We will see how he feels when he eventually comes off of the steroids.    Deangelo asked many insightful questions, but ultimately decided to proceed first with his voiding diaries and then we will consider further testing in the future. Mr. Espinoza expressed understanding and agreement to the above discussion and plan and all of his questions were answered to his satisfaction.     PLAN  Voiding diaries to be completed with results to be sent over [employer]  Future consideration of UDS and or cystoscopy    Signed by:    Sarthak Young PA-C    I spent a total of 87 minutes spent on the date of the encounter doing chart review, history and exam, documentation, and further activities as noted above. "     Again, thank you for allowing me to participate in the care of your patient.      Sincerely,    Sarthak Young PA-C

## 2025-06-05 ENCOUNTER — OFFICE VISIT (OUTPATIENT)
Dept: CARDIOLOGY | Facility: CLINIC | Age: 70
End: 2025-06-05
Payer: COMMERCIAL

## 2025-06-05 VITALS
HEART RATE: 89 BPM | HEIGHT: 70 IN | WEIGHT: 201 LBS | OXYGEN SATURATION: 98 % | SYSTOLIC BLOOD PRESSURE: 111 MMHG | DIASTOLIC BLOOD PRESSURE: 78 MMHG | BODY MASS INDEX: 28.77 KG/M2

## 2025-06-05 DIAGNOSIS — I10 HYPERTENSION, UNSPECIFIED TYPE: Primary | ICD-10-CM

## 2025-06-05 RX ORDER — ASPIRIN 81 MG/1
81 TABLET ORAL DAILY
COMMUNITY

## 2025-06-05 RX ORDER — OLMESARTAN MEDOXOMIL AND HYDROCHLOROTHIAZIDE 40/25 40; 25 MG/1; MG/1
1 TABLET ORAL DAILY
COMMUNITY

## 2025-06-05 RX ORDER — EZETIMIBE 10 MG/1
10 TABLET ORAL DAILY
COMMUNITY

## 2025-06-05 RX ORDER — TADALAFIL 5 MG/1
5 TABLET ORAL DAILY
COMMUNITY

## 2025-06-05 NOTE — PROGRESS NOTES
CARDIOLOGY CLINIC CONSULTATION    PRIMARY CARE PHYSICIAN:  Provider Not In System    HISTORY OF PRESENT ILLNESS:  This is a pleasant 69-year-old man who is seeing me for the first time in cardiology clinic consultation at [facility].  This patient has had care at a few different institutions across the [facility].  He has a home in [place] and [place].  He has gotten his care at [facility] and now seeing us as well.    This patient has a history of coronary artery disease based on a CT calcium score.  He has family history of coronary disease in his father needing bypass surgery and his sister has hypertrophic cardiomyopathy.  Patient has a history of proximal ascending aortic aneurysm measuring 4.4 cm.  Patient has been diagnosed with melanoma since 2016 and seems like metastatic melanoma in 2023 needing immunotherapy.  Patient used to be on atorvastatin but due to elevated liver function test, in 2023 he was switched to Zetia therapy.  His liver function has normalized.    Patient has been completely asymptomatic from a cardiac standpoint.  He takes a baby aspirin and Zetia therapy.  Today he is establishing care because he has been experiencing low blood pressures and lightheadedness.    In regards to medications, the patient takes 0.8 mg of Flomax, 40 mg of olmesartan and 25 mg of hydrochlorothiazide daily.  He experiences blood pressures in the low 100 range.  He has been experiencing lightheadedness and dizziness.  No syncope presyncope.  He has an Apple Watch but does not note any low or high heart rates.  No anginal symptoms.    PAST MEDICAL HISTORY:  History reviewed. No pertinent past medical history.    MEDICATIONS:  Current Outpatient Medications   Medication Sig Dispense Refill    aspirin 81 MG EC tablet Take 81 mg by mouth daily.      ezetimibe (ZETIA) 10 MG tablet Take 10 mg by mouth daily.      hydrocortisone (CORTEF) 10 MG tablet Take by mouth.       levothyroxine (SYNTHROID/LEVOTHROID) 175 MCG tablet Take 175 mcg by mouth daily      Nivolumab (OPDIVO IV) Inject into the vein.      olmesartan-hydrochlorothiazide (BENICAR HCT) 40-25 MG tablet Take 1 tablet by mouth daily.      tadalafil (CIALIS) 5 MG tablet Take 5 mg by mouth daily.      tamsulosin (FLOMAX) 0.4 MG capsule Take 0.4 mg by mouth daily (Patient taking differently: Take 0.4 mg by mouth 2 times daily.)      atorvastatin (LIPITOR) 40 MG tablet Take 40 mg by mouth daily (Patient not taking: Reported on 6/5/2025)       No current facility-administered medications for this visit.       SOCIAL HISTORY:  I have reviewed this patient's social history and updated it with pertinent information if needed. Deangelo Espinoza  reports that he has never smoked. He has never used smokeless tobacco. He reports current alcohol use. He reports that he does not use drugs.    PHYSICAL EXAM:  Pulse:  [89] 89  BP: (111)/(78) 111/78  SpO2:  [98 %] 98 %  201 lbs 0 oz    Constitutional: alert, no distress  Respiratory: Good bilateral air entry  Cardiovascular: Normal regular heart sounds  GI: nondistended  Neuropsychiatric: appropriate affact    ASSESSMENT: Pertinent issues addressed/ reviewed during this cardiology visit  Nonobstructive coronary artery disease based on calcium scan  Ascending aorta measuring 4.4 cm  Low blood pressures    RECOMMENDATIONS:  I have told the patient to reduce his olmesartan hydrochlorothiazide to half a tablet daily.  This will be 20 mg of olmesartan with 12.5 mg of hydrochlorothiazide.  He has an appointment coming up with his PCP in a few weeks.  Further medication adjustments can be made with PCP.  In regards to his coronary disease, I told him to continue aspirin.  He is currently on Zetia.  His liver function has normalized.  He will check in with his oncology teams and liver teams to see if he can be restarted on low-dose of statin therapy.  This would be ideal.  If not Zetia therapy  long-term is fine.  If he has any symptoms from a cardiac perspective, he needs to seek medical attention.  Patient does complain of mild dyspnea on exertion.  However no anginal symptoms.  We will get an echocardiogram.  If normal EF, no further workup recommended.  Lastly I will have him follow-up in the aorta clinic for long-term follow-up for ascending aortic aneurysm.    He can see me as needed if his EF is normal.    It was a pleasure seeing this patient in clinic today. Please do not hesitate to contact me with any future questions.     EVELYN Durant, [facility]  Cardiology - [facility]  June 5, 2025    Review of the result(s) of each unique test - Last CBC BMP echocardiogram outside records     The level of medical decision making during this visit was of moderate complexity.    This note was completed in part using dictation via the Dragon voice recognition software. Some word and grammatical errors may occur and must be interpreted in the appropriate clinical context.  If there are any questions pertaining to this issue, please contact me for further clarification.

## 2025-06-05 NOTE — LETTER
6/5/2025    Provider Not In System       RE: Deangelo LOVE Olga       Dear Colleague,     I had the pleasure of seeing Deangelo Espinoza in the [facility].  CARDIOLOGY CLINIC CONSULTATION    PRIMARY CARE PHYSICIAN:  Provider Not In System    HISTORY OF PRESENT ILLNESS:  This is a pleasant 69-year-old man who is seeing me for the first time in cardiology clinic consultation at [facility].  This patient has had care at a few different institutions across the [facility].  He has a home in [place] and [place].  He has gotten his care at [facility] and now seeing us as well.    This patient has a history of coronary artery disease based on a CT calcium score.  He has family history of coronary disease in his father needing bypass surgery and his sister has hypertrophic cardiomyopathy.  Patient has a history of proximal ascending aortic aneurysm measuring 4.4 cm.  Patient has been diagnosed with melanoma since 2016 and seems like metastatic melanoma in 2023 needing immunotherapy.  Patient used to be on atorvastatin but due to elevated liver function test, in 2023 he was switched to Zetia therapy.  His liver function has normalized.    Patient has been completely asymptomatic from a cardiac standpoint.  He takes a baby aspirin and Zetia therapy.  Today he is establishing care because he has been experiencing low blood pressures and lightheadedness.    In regards to medications, the patient takes 0.8 mg of Flomax, 40 mg of olmesartan and 25 mg of hydrochlorothiazide daily.  He experiences blood pressures in the low 100 range.  He has been experiencing lightheadedness and dizziness.  No syncope presyncope.  He has an Apple Watch but does not note any low or high heart rates.  No anginal symptoms.    PAST MEDICAL HISTORY:  History reviewed. No pertinent past medical history.    MEDICATIONS:  Current Outpatient Medications   Medication Sig Dispense Refill     aspirin 81  MG EC tablet Take 81 mg by mouth daily.       ezetimibe (ZETIA) 10 MG tablet Take 10 mg by mouth daily.       hydrocortisone (CORTEF) 10 MG tablet Take by mouth.       levothyroxine (SYNTHROID/LEVOTHROID) 175 MCG tablet Take 175 mcg by mouth daily       Nivolumab (OPDIVO IV) Inject into the vein.       olmesartan-hydrochlorothiazide (BENICAR HCT) 40-25 MG tablet Take 1 tablet by mouth daily.       tadalafil (CIALIS) 5 MG tablet Take 5 mg by mouth daily.       tamsulosin (FLOMAX) 0.4 MG capsule Take 0.4 mg by mouth daily (Patient taking differently: Take 0.4 mg by mouth 2 times daily.)       atorvastatin (LIPITOR) 40 MG tablet Take 40 mg by mouth daily (Patient not taking: Reported on 6/5/2025)       No current facility-administered medications for this visit.       SOCIAL HISTORY:  I have reviewed this patient's social history and updated it with pertinent information if needed. Deangelo LOVE Joeezekielkeith  reports that he has never smoked. He has never used smokeless tobacco. He reports current alcohol use. He reports that he does not use drugs.    PHYSICAL EXAM:  Pulse:  [89] 89  BP: (111)/(78) 111/78  SpO2:  [98 %] 98 %  201 lbs 0 oz    Constitutional: alert, no distress  Respiratory: Good bilateral air entry  Cardiovascular: Normal regular heart sounds  GI: nondistended  Neuropsychiatric: appropriate affact    ASSESSMENT: Pertinent issues addressed/ reviewed during this cardiology visit  Nonobstructive coronary artery disease based on calcium scan  Ascending aorta measuring 4.4 cm  Low blood pressures    RECOMMENDATIONS:  I have told the patient to reduce his olmesartan hydrochlorothiazide to half a tablet daily.  This will be 20 mg of olmesartan with 12.5 mg of hydrochlorothiazide.  He has an appointment coming up with his PCP in a few weeks.  Further medication adjustments can be made with PCP.  In regards to his coronary disease, I told him to continue aspirin.  He is currently on Zetia.  His liver function has  normalized.  He will check in with his oncology teams and liver teams to see if he can be restarted on low-dose of statin therapy.  This would be ideal.  If not Zetia therapy long-term is fine.  If he has any symptoms from a cardiac perspective, he needs to seek medical attention.  Patient does complain of mild dyspnea on exertion.  However no anginal symptoms.  We will get an echocardiogram.  If normal EF, no further workup recommended.  Lastly I will have him follow-up in the aorta clinic for long-term follow-up for ascending aortic aneurysm.    He can see me as needed if his EF is normal.    It was a pleasure seeing this patient in clinic today. Please do not hesitate to contact me with any future questions.     EVELYN Durant, [facility]  Cardiology - [facility]  June 5, 2025    Review of the result(s) of each unique test - Last CBC BMP echocardiogram outside records     The level of medical decision making during this visit was of moderate complexity.    This note was completed in part using dictation via the Dragon voice recognition software. Some word and grammatical errors may occur and must be interpreted in the appropriate clinical context.  If there are any questions pertaining to this issue, please contact me for further clarification.    Thank you for allowing me to participate in the care of your patient.      Sincerely,     Jena Lucas MD     [facility]  cc:   Referred MD Hussain  No address on file

## 2025-07-11 ENCOUNTER — HOSPITAL ENCOUNTER (OUTPATIENT)
Dept: CARDIOLOGY | Facility: CLINIC | Age: 70
Discharge: HOME | End: 2025-07-11
Attending: INTERNAL MEDICINE | Admitting: INTERNAL MEDICINE
Payer: COMMERCIAL

## 2025-07-11 DIAGNOSIS — I10 HYPERTENSION, UNSPECIFIED TYPE: ICD-10-CM

## 2025-07-11 LAB — LVEF ECHO: NORMAL

## 2025-07-11 PROCEDURE — 93306 TTE W/DOPPLER COMPLETE: CPT

## 2025-07-11 PROCEDURE — 93306 TTE W/DOPPLER COMPLETE: CPT | Mod: 26 | Performed by: INTERNAL MEDICINE

## 2025-07-21 ENCOUNTER — TRANSFERRED RECORDS (OUTPATIENT)
Dept: HEALTH INFORMATION MANAGEMENT | Facility: CLINIC | Age: 70
End: 2025-07-21
Payer: COMMERCIAL

## 2025-07-21 LAB
ALT SERPL-CCNC: 18 U/L (ref 9–46)
AST SERPL-CCNC: 26 U/L (ref 10–35)
CHOLESTEROL (EXTERNAL): 154 MG/DL
CREATININE (EXTERNAL): 1.15 MG/DL (ref 0.7–1.28)
GFR ESTIMATED (EXTERNAL): 68 ML/MIN/1.73M2
GLUCOSE (EXTERNAL): 94 MG/DL (ref 65–99)
HBA1C MFR BLD: 5.2 %
HDLC SERPL-MCNC: 50 MG/DL
HIV 1&2 EXT: NORMAL
LDL CHOLESTEROL CALCULATED (EXTERNAL): 84 MG/DL
NON HDL CHOLESTEROL (EXTERNAL): 104 MG/DL
POTASSIUM (EXTERNAL): 3.7 MMOL/L (ref 3.5–5.3)
TRIGLYCERIDES (EXTERNAL): 108 MG/DL
TSH SERPL-ACNC: 0.99 MIU/L (ref 0.4–4.5)

## 2025-08-04 ENCOUNTER — OFFICE VISIT (OUTPATIENT)
Dept: INTERNAL MEDICINE | Facility: CLINIC | Age: 70
End: 2025-08-04
Payer: COMMERCIAL

## 2025-08-04 VITALS
RESPIRATION RATE: 16 BRPM | BODY MASS INDEX: 28.3 KG/M2 | OXYGEN SATURATION: 98 % | DIASTOLIC BLOOD PRESSURE: 81 MMHG | WEIGHT: 197.7 LBS | HEART RATE: 75 BPM | SYSTOLIC BLOOD PRESSURE: 114 MMHG | TEMPERATURE: 97.7 F | HEIGHT: 70 IN

## 2025-08-04 DIAGNOSIS — Z29.81 ENCOUNTER FOR HIV PRE-EXPOSURE PROPHYLAXIS: Primary | ICD-10-CM

## 2025-08-04 DIAGNOSIS — I10 PRIMARY HYPERTENSION: ICD-10-CM

## 2025-08-04 DIAGNOSIS — Z23 NEED FOR VACCINATION: ICD-10-CM

## 2025-08-04 DIAGNOSIS — N40.1 BENIGN PROSTATIC HYPERPLASIA WITH LOWER URINARY TRACT SYMPTOMS, SYMPTOM DETAILS UNSPECIFIED: ICD-10-CM

## 2025-08-04 DIAGNOSIS — E78.00 HIGH CHOLESTEROL: ICD-10-CM

## 2025-08-04 PROCEDURE — 3079F DIAST BP 80-89 MM HG: CPT | Performed by: INTERNAL MEDICINE

## 2025-08-04 PROCEDURE — 99215 OFFICE O/P EST HI 40 MIN: CPT | Performed by: INTERNAL MEDICINE

## 2025-08-04 PROCEDURE — 3074F SYST BP LT 130 MM HG: CPT | Performed by: INTERNAL MEDICINE

## 2025-08-04 RX ORDER — LEVOTHYROXINE SODIUM 137 UG/1
137 TABLET ORAL DAILY
COMMUNITY

## 2025-08-04 RX ORDER — MULTIVIT WITH MINERALS/LUTEIN
1000 TABLET ORAL
COMMUNITY

## 2025-08-04 RX ORDER — CHLORAL HYDRATE 500 MG
2 CAPSULE ORAL DAILY
COMMUNITY

## 2025-08-04 RX ORDER — CHOLECALCIFEROL (VITAMIN D3) 50 MCG
50 TABLET ORAL DAILY
COMMUNITY

## 2025-08-04 RX ORDER — OLMESARTAN MEDOXOMIL AND HYDROCHLOROTHIAZIDE 40/25 40; 25 MG/1; MG/1
1 TABLET ORAL DAILY
Qty: 90 TABLET | Refills: 3 | Status: SHIPPED | OUTPATIENT
Start: 2025-08-04

## 2025-08-04 RX ORDER — TADALAFIL 5 MG/1
5 TABLET ORAL DAILY
Qty: 90 TABLET | Refills: 3 | Status: SHIPPED | OUTPATIENT
Start: 2025-08-04

## 2025-08-04 RX ORDER — DEXAMETHASONE SODIUM PHOSPHATE 4 MG/ML
4 INJECTION, SOLUTION INTRA-ARTICULAR; INTRALESIONAL; INTRAMUSCULAR; INTRAVENOUS; SOFT TISSUE PRN
COMMUNITY
Start: 2025-06-20

## 2025-08-04 RX ORDER — ZINC GLUCONATE 50 MG
50 TABLET ORAL DAILY
COMMUNITY

## 2025-08-04 RX ORDER — TAMSULOSIN HYDROCHLORIDE 0.4 MG/1
0.4 CAPSULE ORAL DAILY
Qty: 90 CAPSULE | Refills: 3 | Status: SHIPPED | OUTPATIENT
Start: 2025-08-04